# Patient Record
Sex: FEMALE | Race: WHITE
[De-identification: names, ages, dates, MRNs, and addresses within clinical notes are randomized per-mention and may not be internally consistent; named-entity substitution may affect disease eponyms.]

---

## 2021-05-11 ENCOUNTER — HOSPITAL ENCOUNTER (EMERGENCY)
Dept: HOSPITAL 79 - ER1 | Age: 61
LOS: 1 days | Discharge: HOME | End: 2021-05-12
Payer: COMMERCIAL

## 2021-05-11 DIAGNOSIS — S16.1XXA: Primary | ICD-10-CM

## 2021-05-11 DIAGNOSIS — E87.6: ICD-10-CM

## 2021-05-11 DIAGNOSIS — Z90.710: ICD-10-CM

## 2021-05-11 DIAGNOSIS — Z90.49: ICD-10-CM
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
## 2021-05-11 DIAGNOSIS — I11.9: ICD-10-CM

## 2021-05-11 DIAGNOSIS — R51.9: ICD-10-CM
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
## 2021-05-12 LAB
BUN/CREATININE RATIO: 15 (ref 0–10)
HGB BLD-MCNC: 12.9 GM/DL (ref 12.3–15.3)
RED BLOOD COUNT: 4.1 M/UL (ref 4–5.1)
WHITE BLOOD COUNT: 8.2 K/UL (ref 4.5–11)

## 2021-07-04 ENCOUNTER — HOSPITAL ENCOUNTER (EMERGENCY)
Dept: HOSPITAL 79 - ER1 | Age: 61
Discharge: HOME | End: 2021-07-04
Payer: COMMERCIAL

## 2021-07-04 DIAGNOSIS — Z79.899: ICD-10-CM

## 2021-07-04 DIAGNOSIS — R07.9: Primary | ICD-10-CM

## 2021-07-04 DIAGNOSIS — E78.5: ICD-10-CM

## 2021-07-04 DIAGNOSIS — Z87.891: ICD-10-CM

## 2021-07-04 LAB
BUN/CREATININE RATIO: 11 (ref 0–10)
HGB BLD-MCNC: 13.8 GM/DL (ref 12.3–15.3)
RED BLOOD COUNT: 4.32 M/UL (ref 4–5.1)
WHITE BLOOD COUNT: 6.8 K/UL (ref 4.5–11)

## 2021-07-07 ENCOUNTER — HOSPITAL ENCOUNTER (OUTPATIENT)
Dept: HOSPITAL 79 - LAB | Age: 61
End: 2021-07-07
Attending: FAMILY MEDICINE
Payer: COMMERCIAL

## 2021-07-07 DIAGNOSIS — R07.9: ICD-10-CM

## 2021-07-07 DIAGNOSIS — R06.02: Primary | ICD-10-CM

## 2021-08-26 ENCOUNTER — HOSPITAL ENCOUNTER (INPATIENT)
Dept: HOSPITAL 79 - ER1 | Age: 61
LOS: 35 days | DRG: 981 | End: 2021-09-30
Admitting: INTERNAL MEDICINE
Payer: COMMERCIAL

## 2021-08-26 VITALS — WEIGHT: 146 LBS | HEIGHT: 66 IN | BODY MASS INDEX: 23.46 KG/M2

## 2021-08-26 DIAGNOSIS — N17.9: ICD-10-CM

## 2021-08-26 DIAGNOSIS — Z79.82: ICD-10-CM

## 2021-08-26 DIAGNOSIS — J44.0: ICD-10-CM

## 2021-08-26 DIAGNOSIS — Z51.5: ICD-10-CM

## 2021-08-26 DIAGNOSIS — Z98.51: ICD-10-CM

## 2021-08-26 DIAGNOSIS — Z90.49: ICD-10-CM

## 2021-08-26 DIAGNOSIS — Z87.891: ICD-10-CM

## 2021-08-26 DIAGNOSIS — Z82.49: ICD-10-CM

## 2021-08-26 DIAGNOSIS — K21.9: ICD-10-CM

## 2021-08-26 DIAGNOSIS — U07.1: Primary | ICD-10-CM

## 2021-08-26 DIAGNOSIS — K59.00: ICD-10-CM

## 2021-08-26 DIAGNOSIS — E86.0: ICD-10-CM

## 2021-08-26 DIAGNOSIS — Z88.5: ICD-10-CM

## 2021-08-26 DIAGNOSIS — Z90.89: ICD-10-CM

## 2021-08-26 DIAGNOSIS — Z98.890: ICD-10-CM

## 2021-08-26 DIAGNOSIS — J12.82: ICD-10-CM

## 2021-08-26 DIAGNOSIS — E78.5: ICD-10-CM

## 2021-08-26 DIAGNOSIS — R94.5: ICD-10-CM

## 2021-08-26 DIAGNOSIS — E53.8: ICD-10-CM

## 2021-08-26 DIAGNOSIS — J96.01: ICD-10-CM

## 2021-08-26 DIAGNOSIS — Z66: ICD-10-CM

## 2021-08-26 DIAGNOSIS — M51.36: ICD-10-CM

## 2021-08-26 DIAGNOSIS — K58.9: ICD-10-CM

## 2021-08-26 DIAGNOSIS — J15.9: ICD-10-CM

## 2021-08-26 DIAGNOSIS — K56.7: ICD-10-CM

## 2021-08-26 DIAGNOSIS — I25.10: ICD-10-CM

## 2021-08-26 DIAGNOSIS — R74.01: ICD-10-CM

## 2021-08-26 DIAGNOSIS — Z79.899: ICD-10-CM

## 2021-08-26 DIAGNOSIS — Z88.8: ICD-10-CM

## 2021-08-26 DIAGNOSIS — E87.1: ICD-10-CM

## 2021-08-26 LAB
BUN/CREATININE RATIO: 19 (ref 0–10)
HGB BLD-MCNC: 13.5 GM/DL (ref 12.3–15.3)
RED BLOOD COUNT: 4.26 M/UL (ref 4–5.1)
WHITE BLOOD COUNT: 5.3 K/UL (ref 4.5–11)

## 2021-08-26 PROCEDURE — 8E0ZXY6 ISOLATION: ICD-10-PCS | Performed by: INTERNAL MEDICINE

## 2021-08-26 PROCEDURE — U0002 COVID-19 LAB TEST NON-CDC: HCPCS

## 2021-08-26 PROCEDURE — 5A09557 ASSISTANCE WITH RESPIRATORY VENTILATION, GREATER THAN 96 CONSECUTIVE HOURS, CONTINUOUS POSITIVE AIRWAY PRESSURE: ICD-10-PCS | Performed by: INTERNAL MEDICINE

## 2021-08-26 PROCEDURE — XW033E5 INTRODUCTION OF REMDESIVIR ANTI-INFECTIVE INTO PERIPHERAL VEIN, PERCUTANEOUS APPROACH, NEW TECHNOLOGY GROUP 5: ICD-10-PCS | Performed by: INTERNAL MEDICINE

## 2021-08-26 PROCEDURE — XW13325 TRANSFUSION OF CONVALESCENT PLASMA (NONAUTOLOGOUS) INTO PERIPHERAL VEIN, PERCUTANEOUS APPROACH, NEW TECHNOLOGY GROUP 5: ICD-10-PCS | Performed by: INTERNAL MEDICINE

## 2021-08-27 LAB
BUN/CREATININE RATIO: 26 (ref 0–10)
HGB BLD-MCNC: 12.3 GM/DL (ref 12.3–15.3)
RED BLOOD COUNT: 3.85 M/UL (ref 4–5.1)
WHITE BLOOD COUNT: 3.1 K/UL (ref 4.5–11)

## 2021-08-28 LAB
BUN/CREATININE RATIO: 25 (ref 0–10)
HGB BLD-MCNC: 12.8 GM/DL (ref 12.3–15.3)
RED BLOOD COUNT: 4.13 M/UL (ref 4–5.1)
WHITE BLOOD COUNT: 8 K/UL (ref 4.5–11)

## 2021-08-29 LAB
BUN/CREATININE RATIO: 24 (ref 0–10)
HGB BLD-MCNC: 11.6 GM/DL (ref 12.3–15.3)
RED BLOOD COUNT: 3.78 M/UL (ref 4–5.1)
WHITE BLOOD COUNT: 7.6 K/UL (ref 4.5–11)

## 2021-08-29 PROCEDURE — 3E0333Z INTRODUCTION OF ANTI-INFLAMMATORY INTO PERIPHERAL VEIN, PERCUTANEOUS APPROACH: ICD-10-PCS | Performed by: INTERNAL MEDICINE

## 2021-08-30 LAB — BUN/CREATININE RATIO: 22 (ref 0–10)

## 2021-08-30 NOTE — NUR
DISCUSSED PT WITH DR SCHWARTZ DURING ROUTINE ROUNDING.  PT ON 90% FIO2 VIA
AIRVO. PT HAVING MORE FREQUENT EPISODES OF O2 SATS DROPPING TO LOW 80'S AND
BECOMING MORE DIFFICULT TO BRING BACK UP TO 90% OR HIGHER.  CONCERN FOR BIPAP
NEED DISCUSSED. WILL MOVE PT TO PCU WHEN BED AVAILABLE IN PROACTIVE MOVE IF
NEED FOR BIPAP

## 2021-08-31 LAB
BUN/CREATININE RATIO: 20 (ref 0–10)
HGB BLD-MCNC: 12.4 GM/DL (ref 12.3–15.3)
RED BLOOD COUNT: 3.96 M/UL (ref 4–5.1)
WHITE BLOOD COUNT: 10.6 K/UL (ref 4.5–11)

## 2021-09-01 LAB
BUN/CREATININE RATIO: 21 (ref 0–10)
HGB BLD-MCNC: 12.2 GM/DL (ref 12.3–15.3)
RED BLOOD COUNT: 3.89 M/UL (ref 4–5.1)
WHITE BLOOD COUNT: 9.1 K/UL (ref 4.5–11)

## 2021-09-02 LAB
BUN/CREATININE RATIO: 28 (ref 0–10)
HGB BLD-MCNC: 12.4 GM/DL (ref 12.3–15.3)
RED BLOOD COUNT: 3.95 M/UL (ref 4–5.1)
WHITE BLOOD COUNT: 11 K/UL (ref 4.5–11)

## 2021-09-03 LAB
BUN/CREATININE RATIO: 22 (ref 0–10)
HGB BLD-MCNC: 11.4 GM/DL (ref 12.3–15.3)
RED BLOOD COUNT: 3.67 M/UL (ref 4–5.1)
WHITE BLOOD COUNT: 10.6 K/UL (ref 4.5–11)

## 2021-09-04 LAB
BUN/CREATININE RATIO: 21 (ref 0–10)
HGB BLD-MCNC: 11.1 GM/DL (ref 12.3–15.3)
RED BLOOD COUNT: 3.66 M/UL (ref 4–5.1)
WHITE BLOOD COUNT: 10.8 K/UL (ref 4.5–11)

## 2021-09-05 LAB
BUN/CREATININE RATIO: 26 (ref 0–10)
HGB BLD-MCNC: 11.8 GM/DL (ref 12.3–15.3)
RED BLOOD COUNT: 3.95 M/UL (ref 4–5.1)
WHITE BLOOD COUNT: 15.9 K/UL (ref 4.5–11)

## 2021-09-06 LAB
BUN/CREATININE RATIO: 25 (ref 0–10)
HGB BLD-MCNC: 11.4 GM/DL (ref 12.3–15.3)
RED BLOOD COUNT: 3.84 M/UL (ref 4–5.1)
WHITE BLOOD COUNT: 17.1 K/UL (ref 4.5–11)

## 2021-09-06 NOTE — NUR
patient had an episode of pulse ox on high 50's low 60's. during that time
patient was given a bed bath by cna's and patient using his airvo. no
respiratory distress noted. patient was put on bipap and currently resting
comfortably.

## 2021-09-06 NOTE — NUR
received call from telemetry patient pulse ox on 80's and will change airvo to
bipap. rt in the room with the patient at this time

## 2021-09-06 NOTE — NUR
reported to dr. john earlier of patient episode of low o2 saturation with
activity and rt changed patient from airvo to bipap. he acknowledged

## 2021-09-07 LAB
BUN/CREATININE RATIO: 28 (ref 0–10)
HGB BLD-MCNC: 11 GM/DL (ref 12.3–15.3)
RED BLOOD COUNT: 3.7 M/UL (ref 4–5.1)
WHITE BLOOD COUNT: 16.1 K/UL (ref 4.5–11)

## 2021-09-09 NOTE — NUR
reported to dominique johnson of patient episode decrease of pulse ox to 80's for
few seconds and back to low 90's. no s/sx of respiratory distress noted. dominique
to see patient

## 2021-09-09 NOTE — NUR
has been attempting to titrate patient oxygen-airvo with assistance from RT
 with no success at this time. airvo back to 50L and 88%.

## 2021-09-10 LAB
BUN/CREATININE RATIO: 29 (ref 0–10)
HGB BLD-MCNC: 10.9 GM/DL (ref 12.3–15.3)
RED BLOOD COUNT: 3.62 M/UL (ref 4–5.1)
WHITE BLOOD COUNT: 13.8 K/UL (ref 4.5–11)

## 2021-09-11 LAB
BUN/CREATININE RATIO: 24 (ref 0–10)
HGB BLD-MCNC: 11.8 GM/DL (ref 12.3–15.3)
RED BLOOD COUNT: 3.93 M/UL (ref 4–5.1)
WHITE BLOOD COUNT: 13.1 K/UL (ref 4.5–11)

## 2021-09-12 LAB
BUN/CREATININE RATIO: 34 (ref 0–10)
HGB BLD-MCNC: 11.3 GM/DL (ref 12.3–15.3)
RED BLOOD COUNT: 3.72 M/UL (ref 4–5.1)
WHITE BLOOD COUNT: 12.7 K/UL (ref 4.5–11)

## 2021-09-12 NOTE — NUR
Notified Dr. Gamboa about patient spiking temp of 102.6 and he gave me a PRN
Q4H dose of 650mg of Tylenol.

## 2021-09-13 LAB
BUN/CREATININE RATIO: 29 (ref 0–10)
HGB BLD-MCNC: 11.1 GM/DL (ref 12.3–15.3)
RED BLOOD COUNT: 3.71 M/UL (ref 4–5.1)
WHITE BLOOD COUNT: 13.3 K/UL (ref 4.5–11)

## 2021-09-14 LAB
BUN/CREATININE RATIO: 27 (ref 0–10)
HGB BLD-MCNC: 11.1 GM/DL (ref 12.3–15.3)
RED BLOOD COUNT: 3.66 M/UL (ref 4–5.1)
WHITE BLOOD COUNT: 12.5 K/UL (ref 4.5–11)

## 2021-09-15 LAB
BUN/CREATININE RATIO: 27 (ref 0–10)
HGB BLD-MCNC: 11.2 GM/DL (ref 12.3–15.3)
RED BLOOD COUNT: 3.7 M/UL (ref 4–5.1)
WHITE BLOOD COUNT: 12.8 K/UL (ref 4.5–11)

## 2021-09-16 LAB
BUN/CREATININE RATIO: 29 (ref 0–10)
HGB BLD-MCNC: 11.1 GM/DL (ref 12.3–15.3)
RED BLOOD COUNT: 3.58 M/UL (ref 4–5.1)
WHITE BLOOD COUNT: 12.2 K/UL (ref 4.5–11)

## 2021-09-17 LAB
BUN/CREATININE RATIO: 25 (ref 0–10)
HGB BLD-MCNC: 11.8 GM/DL (ref 12.3–15.3)
RED BLOOD COUNT: 3.76 M/UL (ref 4–5.1)
WHITE BLOOD COUNT: 9.8 K/UL (ref 4.5–11)

## 2021-09-18 LAB — BUN/CREATININE RATIO: 21 (ref 0–10)

## 2021-09-19 LAB
BUN/CREATININE RATIO: 25 (ref 0–10)
HGB BLD-MCNC: 11.4 GM/DL (ref 12.3–15.3)
RED BLOOD COUNT: 3.63 M/UL (ref 4–5.1)
WHITE BLOOD COUNT: 10.2 K/UL (ref 4.5–11)

## 2021-09-19 NOTE — NUR
received call from telemetry patient pulse 140 and pulse ox 79. evaluate
patient no s/sx of respiratory distress, back to bed from using bsc. cna in
the room assisting patient. pulse 108, pulse ox 91

## 2021-09-19 NOTE — NUR
receives call from telemetry stating patient pulse ox low - patient ambulated
from bed to chair. noted on reading of pulse and o2 sat low ranges from 78-84
with current airvo 50L to 78%.
RT adjusted airvo and currently patient 95% and pulse to 96. will cont to
monitor

## 2021-09-20 LAB
BUN/CREATININE RATIO: 29 (ref 0–10)
HGB BLD-MCNC: 11.3 GM/DL (ref 12.3–15.3)
RED BLOOD COUNT: 3.57 M/UL (ref 4–5.1)
WHITE BLOOD COUNT: 11.8 K/UL (ref 4.5–11)

## 2021-09-21 LAB
BUN/CREATININE RATIO: 28 (ref 0–10)
HGB BLD-MCNC: 11.4 GM/DL (ref 12.3–15.3)
RED BLOOD COUNT: 3.61 M/UL (ref 4–5.1)
WHITE BLOOD COUNT: 10.6 K/UL (ref 4.5–11)

## 2021-09-22 NOTE — NUR
reported to dr. hedrick earlier of patient vomitting episode and medicine
for her bowel to move and dr. hedrick acknowledged

## 2021-09-24 LAB — BUN/CREATININE RATIO: 49 (ref 0–10)

## 2021-09-25 LAB — BUN/CREATININE RATIO: 26 (ref 0–10)

## 2021-09-25 NOTE — NUR
NURSE SPOKE WITH PATIENT REGARDING CODE STATUS AFTER PATIENT DESATTED AND HAD
 HARD TIME RETURNING TO NORMAL, PATIENT STATED LATER THAT SHE STILL WANTED TO
BE A DNR/ DNI. PATIENT CODE STATUS WAS UPDATED BOTH TIMES.

## 2021-09-26 LAB — BUN/CREATININE RATIO: 20 (ref 0–10)

## 2021-09-27 LAB — BUN/CREATININE RATIO: 26 (ref 0–10)

## 2021-09-27 NOTE — NUR
dr. hernandez on the floor- discuss patient condition. patient will remain on
the covid isolation per dr. hernandez. discuss isolation with [facility] infection
control- and informed of the guidelines. as stated earlier patient will be on
covid isolation.

## 2021-09-28 LAB
BUN/CREATININE RATIO: 33 (ref 0–10)
HGB BLD-MCNC: 9.2 GM/DL (ref 12.3–15.3)
RED BLOOD COUNT: 2.86 M/UL (ref 4–5.1)
WHITE BLOOD COUNT: 22.4 K/UL (ref 4.5–11)

## 2021-09-28 PROCEDURE — 0D9H0ZZ DRAINAGE OF CECUM, OPEN APPROACH: ICD-10-PCS | Performed by: SURGERY

## 2021-09-28 NOTE — NUR
spoken to dr. john and reported patients abdomen distended,
patient vomiting, and
complaints of pain. received order.

## 2021-09-29 LAB
BUN/CREATININE RATIO: 35 (ref 0–10)
HGB BLD-MCNC: 8.7 GM/DL (ref 12.3–15.3)
RED BLOOD COUNT: 2.64 M/UL (ref 4–5.1)
WHITE BLOOD COUNT: 15.7 K/UL (ref 4.5–11)

## 2021-09-29 PROCEDURE — 0BH18EZ INSERTION OF ENDOTRACHEAL AIRWAY INTO TRACHEA, VIA NATURAL OR ARTIFICIAL OPENING ENDOSCOPIC: ICD-10-PCS | Performed by: EMERGENCY MEDICINE

## 2021-09-29 PROCEDURE — 5A1945Z RESPIRATORY VENTILATION, 24-96 CONSECUTIVE HOURS: ICD-10-PCS | Performed by: EMERGENCY MEDICINE

## 2021-09-29 NOTE — NUR
AT 0000 VENT PRESSURE ALARM GOING OFF, AFTER ASSESSMENT ET TUBE NOTED TO BE
KINKED. RT NOTIFIED AND WAS UNABLE TO CORRECT IT. AT 0015 DR YU HERE FROM ER
TO USE BOUGIE TO CHANGE ET TUBE, PROCEDURE WENT WELL A CHEST XRAY WAS OBTAINED
AND DR YU STATED THAT THE TUBE WAS IN THE CORRECT PLACE WITH BILAT BREATH
SOUNDS AND CO2 WITH GOOD COLOR CHANGE. PT'S O2 SAT DECREASING INTO THE
60'S. AT 0042 DR YU DECIDED TO RE-INTUBATE PT. RT AT BEDSIDE AND DR BARNHART
AWARE. 7.5 TUBE PLACED WITH GOOD COLOR CHANGE BREATH SOUNDS BILAT, OG PLACED
BY DR YU USING A GLYDOSCOPE. CHEST X RAY CONFIRMED PLACEMENT OF OG AND ET
TUBE TO BE IN CORRECT PLACE. BP ALSO DECREASING AT THIS TIME, PT ALREADY ON
PHENYLEPHRINE. WAS TITRATING BUT WAS NOT IMPROVING BP, DR BARNHART REQUESTED PT
BE ON LEVOPHED AND BP IMPROVING AT THIS TIME.

## 2021-09-30 NOTE — NUR
0241, PT ASYSTOLE ON MONITOR, NO RESPIRATIONS,NO HEART TONES,NO BREATH SOUNDS,
NO SPONTANIOUS BREATHS,NO CORNEAL REFLEX NOTED, PT PROUNOUNCED AT 0241 AM BY
OLEGARIO IVEY RN, MARCIE DONOVAN RN.